# Patient Record
Sex: FEMALE | Race: WHITE | NOT HISPANIC OR LATINO
[De-identification: names, ages, dates, MRNs, and addresses within clinical notes are randomized per-mention and may not be internally consistent; named-entity substitution may affect disease eponyms.]

---

## 2023-03-21 ENCOUNTER — APPOINTMENT (OUTPATIENT)
Dept: VASCULAR SURGERY | Facility: CLINIC | Age: 58
End: 2023-03-21
Payer: COMMERCIAL

## 2023-03-21 PROCEDURE — 99204 OFFICE O/P NEW MOD 45 MIN: CPT

## 2023-04-12 ENCOUNTER — APPOINTMENT (OUTPATIENT)
Dept: VASCULAR SURGERY | Facility: CLINIC | Age: 58
End: 2023-04-12
Payer: COMMERCIAL

## 2023-04-12 PROCEDURE — 99214 OFFICE O/P EST MOD 30 MIN: CPT

## 2023-04-19 PROBLEM — Z00.00 ENCOUNTER FOR PREVENTIVE HEALTH EXAMINATION: Status: ACTIVE | Noted: 2023-04-19

## 2023-05-01 ENCOUNTER — APPOINTMENT (OUTPATIENT)
Dept: VASCULAR SURGERY | Facility: CLINIC | Age: 58
End: 2023-05-01
Payer: COMMERCIAL

## 2023-05-01 ENCOUNTER — NON-APPOINTMENT (OUTPATIENT)
Age: 58
End: 2023-05-01

## 2023-05-01 VITALS
OXYGEN SATURATION: 95 % | WEIGHT: 260 LBS | HEIGHT: 63 IN | RESPIRATION RATE: 18 BRPM | HEART RATE: 66 BPM | BODY MASS INDEX: 46.07 KG/M2 | DIASTOLIC BLOOD PRESSURE: 77 MMHG | SYSTOLIC BLOOD PRESSURE: 173 MMHG

## 2023-05-01 VITALS
RESPIRATION RATE: 18 BRPM | HEART RATE: 68 BPM | DIASTOLIC BLOOD PRESSURE: 89 MMHG | OXYGEN SATURATION: 95 % | SYSTOLIC BLOOD PRESSURE: 143 MMHG

## 2023-05-01 PROCEDURE — 36475 ENDOVENOUS RF 1ST VEIN: CPT | Mod: RT

## 2023-05-02 NOTE — PROCEDURE
[FreeTextEntry1] : RFA right greater saphenous vein. [FreeTextEntry2] : Right greater saphenous venous insufficiency and reflux. [FreeTextEntry3] : Insert the patient was seen in the waiting room where the consent was obtained.  Then the patient was  taken to the procedure room where a timeout was called to verify her identity and the laterality of the procedure. The patient's  leg was  interrogated under ultrasound and an appropriate place for cannulation was identified. The leg  was  prepped  with chloroprep and draped in the standard fashion. Under ultrasound guidance  1% plain lidocaine was injected  in the venous access site. Access was then obtained with a 7 FR sheath in the standard fashion using a micropuncture technique. Inner dilator was removed. The sheath was irrigated. RFA catheter was placed to the SFJ and withdrawn 3.0 cm from the junction.  Tumescence was administered around the saphenous vein under ultrasound guidance. The ablation was  performed Using a total of 8 cycles. The catheter and sheath were withdrawn. Complete hemostasis was achieved with manual compression. Steri strips were applied to catheter insertion site. Leg was wrapped in Kerlix and an ace wrap. Patient tolerated the procedure well and verbalized understanding of post-procedure instructions. The patient was discharged in stable condition.\par \par

## 2023-05-12 ENCOUNTER — APPOINTMENT (OUTPATIENT)
Dept: VASCULAR SURGERY | Facility: CLINIC | Age: 58
End: 2023-05-12

## 2023-05-15 ENCOUNTER — APPOINTMENT (OUTPATIENT)
Dept: VASCULAR SURGERY | Facility: CLINIC | Age: 58
End: 2023-05-15
Payer: COMMERCIAL

## 2023-05-15 VITALS
RESPIRATION RATE: 18 BRPM | SYSTOLIC BLOOD PRESSURE: 142 MMHG | HEART RATE: 59 BPM | OXYGEN SATURATION: 95 % | DIASTOLIC BLOOD PRESSURE: 81 MMHG

## 2023-05-15 VITALS
DIASTOLIC BLOOD PRESSURE: 76 MMHG | HEIGHT: 63 IN | RESPIRATION RATE: 18 BRPM | OXYGEN SATURATION: 98 % | WEIGHT: 260 LBS | BODY MASS INDEX: 46.07 KG/M2 | HEART RATE: 69 BPM | SYSTOLIC BLOOD PRESSURE: 153 MMHG

## 2023-05-15 PROCEDURE — 36475 ENDOVENOUS RF 1ST VEIN: CPT | Mod: LT

## 2023-05-16 NOTE — PROCEDURE
[FreeTextEntry1] : Left greater saphenous vein RF ablation [FreeTextEntry2] : Symptomatic venous insufficiency and reflux.  Edema.  Symptomatic varicose veins [FreeTextEntry3] : The patient was seen in the waiting room where the consent was obtained.  Then the patient was  taken to the procedure room where a timeout was called verifying patient's identity and the laterality of the procedure. The patient's right leg was  interrogated under ultrasound. An appropriate place for cannulation was identified. The leg  was  prepped  with chloroprep and draped in the standard fashion. Under ultrasound guidance  1% plain lidocaine was injected  in the venous access site. Access was then obtained with a 7 FR sheath in the standard fashion using a micropuncture technique. Inner dilator was removed. The sheath was irrigated. RFA catheter was placed to the SFJ and withdrawn 3.0 cm from the junction.  Tumescence was administered around the saphenous vein under ultrasound guidance. The ablation was  performed Using a total of 7 cycles. The catheter and sheath were withdrawn. Complete hemostasis was achieved with manual compression. Steri strips were applied to catheter insertion site. Leg was wrapped in Kerlix and an ace wrap. Patient tolerated the procedure well and verbalized understanding of post-procedure instructions. The patient was discharged in stable condition.\par

## 2023-05-16 NOTE — REASON FOR VISIT
[Procedure: _________] : a [unfilled] procedure visit [FreeTextEntry1] : Left greater saphenous vein ablation

## 2023-05-23 ENCOUNTER — APPOINTMENT (OUTPATIENT)
Dept: VASCULAR SURGERY | Facility: CLINIC | Age: 58
End: 2023-05-23
Payer: COMMERCIAL

## 2023-05-23 PROCEDURE — 99213 OFFICE O/P EST LOW 20 MIN: CPT

## 2023-05-23 PROCEDURE — 93971 EXTREMITY STUDY: CPT

## 2023-05-25 NOTE — ASSESSMENT
[FreeTextEntry1] : 57 year-old female with bilateral lower extremity symptomatic varicose veins and edema.  A previous US demonstrated enlarged saphenous veins with reflux bilaterally. She is now one week s/p an ablation of the left GSV and 3 weeks s/p R GSV ablation and is doing well post-procedure. She underwent a left lower extremity venous duplex that demonstrated an appropriately ablated GSV and no evidence of DVT. Continue compression.\par \par She will follow up in 1-2 months or sooner as needed.

## 2023-05-25 NOTE — HISTORY OF PRESENT ILLNESS
[FreeTextEntry1] : 57 year-old female with a history of smoking quit in 2022, HTN, AF on Xarelto, CAD, fibromyalgia, osteoarthritis presented for a follow up on bilateral lower extremity circulation. Patient has bilateral lower extremity edema with varicose veins. Symptoms have been present for years and have gotten progressively worse over time. Associated with throbbing, discomfort, and pain. Alleviated by leg elevation and rest. Aggravated by prolonged activity and standing. Most pronounced in  the evenings, especially after busy days and prolonged standing.  [de-identified] : 57 year-old female returns in follow up. The patient is now one week s/p an ablation of the left GSV and 3 weeks s/p ablation of R GSV. The patient is doing well post-procedure. swelling imporved. Pain controlled.  She is here to undergo a post-procedure US and discuss the results.\par \par Review of systems: 12 system ROS is negative except for as per HPI.\par \par \par

## 2023-05-25 NOTE — PHYSICAL EXAM
[Normal Thyroid] : the thyroid was normal [2+] : left 2+ [No Rash or Lesion] : No rash or lesion [Alert] : alert [Oriented to Person] : oriented to person [Oriented to Place] : oriented to place [Oriented to Time] : oriented to time [Calm] : calm [JVD] : no jugular venous distention  [Ankle Swelling (On Exam)] : present [Varicose Veins Of Lower Extremities] : not present [] : present [de-identified] : NAD. Awake and Alert. [de-identified] : NCAT. Extraocular muscles in tact. [FreeTextEntry1] : dp/pt biphasic signals. Nonpalpable pedal pulses [de-identified] : Soft, NT, ND. No guarding. No palpable masses. No HSM. [de-identified] : No CVA tenderness. [de-identified] : FROM in all extremities. Normal muscle bulk and tone. [de-identified] : Bilateral edema and varicose veins > 3 mm. Catheter insertion site clean, dry, and in tact. Erythema and tenderness on medial thigh consistent with thrombophlebitis. [de-identified] : AAOx3, CN II-XII intact. Strength 5/5 in all 4 extremities. Sensation intact throughout. [de-identified] : Appropriate affect.

## 2023-07-25 ENCOUNTER — APPOINTMENT (OUTPATIENT)
Dept: VASCULAR SURGERY | Facility: CLINIC | Age: 58
End: 2023-07-25
Payer: COMMERCIAL

## 2023-07-25 VITALS — SYSTOLIC BLOOD PRESSURE: 159 MMHG | DIASTOLIC BLOOD PRESSURE: 110 MMHG | HEART RATE: 57 BPM

## 2023-07-25 DIAGNOSIS — Z82.49 FAMILY HISTORY OF ISCHEMIC HEART DISEASE AND OTHER DISEASES OF THE CIRCULATORY SYSTEM: ICD-10-CM

## 2023-07-25 DIAGNOSIS — Z80.0 FAMILY HISTORY OF MALIGNANT NEOPLASM OF DIGESTIVE ORGANS: ICD-10-CM

## 2023-07-25 DIAGNOSIS — Z87.891 PERSONAL HISTORY OF NICOTINE DEPENDENCE: ICD-10-CM

## 2023-07-25 DIAGNOSIS — Z80.3 FAMILY HISTORY OF MALIGNANT NEOPLASM OF BREAST: ICD-10-CM

## 2023-07-25 DIAGNOSIS — R60.0 LOCALIZED EDEMA: ICD-10-CM

## 2023-07-25 PROCEDURE — 99213 OFFICE O/P EST LOW 20 MIN: CPT

## 2023-07-25 RX ORDER — ROSUVASTATIN CALCIUM 5 MG/1
TABLET, FILM COATED ORAL
Refills: 0 | Status: ACTIVE | COMMUNITY

## 2023-07-25 RX ORDER — FUROSEMIDE 40 MG/1
40 TABLET ORAL
Refills: 0 | Status: ACTIVE | COMMUNITY

## 2023-07-25 RX ORDER — RIVAROXABAN 2.5 MG/1
TABLET, FILM COATED ORAL
Refills: 0 | Status: ACTIVE | COMMUNITY

## 2023-07-25 RX ORDER — ASPIRIN 81 MG
81 TABLET, DELAYED RELEASE (ENTERIC COATED) ORAL
Refills: 0 | Status: ACTIVE | COMMUNITY

## 2023-07-25 NOTE — ASSESSMENT
[FreeTextEntry1] : 58 year-old female with bilateral lower extremity symptomatic varicose veins and edema.  A previous US demonstrated enlarged saphenous veins with reflux bilaterally and left small saphenous vein reflux. She is now s/p an ablation of bilateral greater saphenous veins and is doing well however she does have residual lower extremity edema not controlled with compression.\par \par Patient would like to proceed with endovenous ablation of residual small saphenous vein with reflux.  We discussed risk benefits and alternatives and all questions were answered.  She is planning to see a neurologist.  For restless leg syndrome and orthopedist for bilateral osteoarthritis.\par \par 22 minutes\par \par

## 2023-07-25 NOTE — HISTORY OF PRESENT ILLNESS
[FreeTextEntry1] : 58 year-old female with a history of smoking quit in 2022, HTN, AF on Xarelto, CAD, fibromyalgia, osteoarthritis presented for a follow up on bilateral lower extremity circulation. Patient has bilateral lower extremity edema with varicose veins. Symptoms have been present for years and have gotten progressively worse over time. Associated with throbbing, discomfort, and pain. Alleviated by leg elevation and rest. Aggravated by prolonged activity and standing. Most pronounced in  the evenings, especially after busy days and prolonged standing.  [de-identified] : 58 year-old female returns in follow up. The patient is now s/p an ablation of the BGSV. The patient is doing well post-procedure and her recovery was uncomplicated.. swelling improved but did not resolve completely.  She continues to have residual swelling and discomfort in bilateral lower extremities that is not well controlled with compression.\par \par Patient continues to complain of bilateral lower extremity osteoarthritis of the knees and left hip.  She has restless leg syndrome for which she is planning to see a neurologist.\par \par Review of systems: 12 system ROS is negative except for as per HPI.\par \par \par

## 2023-07-25 NOTE — DATA REVIEWED
[FreeTextEntry1] : Left lower extremity venous duplex ordered, performed, and reviewed - No DVT, appropriately ablated GSV.

## 2023-07-25 NOTE — PHYSICAL EXAM
[Normal Thyroid] : the thyroid was normal [2+] : left 2+ [Ankle Swelling (On Exam)] : present [] : present [No Rash or Lesion] : No rash or lesion [Alert] : alert [Oriented to Person] : oriented to person [Oriented to Place] : oriented to place [Oriented to Time] : oriented to time [Calm] : calm [JVD] : no jugular venous distention  [Varicose Veins Of Lower Extremities] : not present [de-identified] : NAD. Awake and Alert. [de-identified] : NCAT. Extraocular muscles in tact. [FreeTextEntry1] : dp/pt biphasic signals. Nonpalpable pedal pulses [de-identified] : Soft, NT, ND. No guarding. No palpable masses. No HSM. [de-identified] : No CVA tenderness. [de-identified] : FROM in all extremities. Normal muscle bulk and tone. [de-identified] : Bilateral edema and varicose veins > 3 mm. Catheter insertion site clean, dry, and in tact. Erythema and tenderness on medial thigh consistent with thrombophlebitis. [de-identified] : AAOx3, CN II-XII intact. Strength 5/5 in all 4 extremities. Sensation intact throughout. [de-identified] : Appropriate affect.

## 2024-03-28 ENCOUNTER — APPOINTMENT (OUTPATIENT)
Dept: VASCULAR SURGERY | Facility: CLINIC | Age: 59
End: 2024-03-28
Payer: COMMERCIAL

## 2024-03-28 DIAGNOSIS — E78.5 HYPERLIPIDEMIA, UNSPECIFIED: ICD-10-CM

## 2024-03-28 DIAGNOSIS — I48.91 UNSPECIFIED ATRIAL FIBRILLATION: ICD-10-CM

## 2024-03-28 DIAGNOSIS — E11.9 TYPE 2 DIABETES MELLITUS W/OUT COMPLICATIONS: ICD-10-CM

## 2024-03-28 DIAGNOSIS — I83.899 VARICOSE VEINS OF UNSPECIFIED LOWER EXTREMITY WITH OTHER COMPLICATIONS: ICD-10-CM

## 2024-03-28 DIAGNOSIS — I10 ESSENTIAL (PRIMARY) HYPERTENSION: ICD-10-CM

## 2024-03-28 PROCEDURE — 99214 OFFICE O/P EST MOD 30 MIN: CPT

## 2024-03-28 RX ORDER — DULOXETINE HYDROCHLORIDE 40 MG/1
CAPSULE, DELAYED RELEASE PELLETS ORAL
Refills: 0 | Status: ACTIVE | COMMUNITY

## 2024-03-28 RX ORDER — METOPROLOL TARTRATE 75 MG/1
75 TABLET, FILM COATED ORAL
Refills: 0 | Status: ACTIVE | COMMUNITY

## 2024-03-28 NOTE — ASSESSMENT
[FreeTextEntry1] : 58-year-old female, previously a patient of Dr. Echevarria, who underwent bilateral GSV ablations for symptomatic venous reflux.  She reports good symptomatic relief after procedures.  She is scheduled to undergo right SSV ablation, but was lost to follow-up.  She reports that she has varicose veins along her left medial thigh and calf which continued to cause her pain and discomfort.  She reports she has heaviness and aching of both legs as the day progresses. She is softly palpable right-sided pulses, triphasic left-sided signals, we will obtain an KEATON to ensure no arterial disease.  Will obtain venous duplex to assess for axial reflux as contributory to recurrent venous issues, after which she will follow-up with me.  Advised to continue use of compression garments.

## 2024-03-28 NOTE — HISTORY OF PRESENT ILLNESS
[FreeTextEntry1] : 58 year-old female with a history of smoking quit in 2022, HTN, AF on Xarelto, CAD, fibromyalgia, osteoarthritis presented for a follow up on bilateral lower extremity circulation. Patient has bilateral lower extremity edema with varicose veins. Symptoms have been present for years and have gotten progressively worse over time. Associated with throbbing, discomfort, and pain. Alleviated by leg elevation and rest. Aggravated by prolonged activity and standing. Most pronounced in  the evenings, especially after busy days and prolonged standing.  [de-identified] : 58 year-old female returns in follow up. The patient is now s/p an ablation of the BGSV. The patient is doing well post-procedure and her recovery was uncomplicated.. swelling improved but did not resolve completely.  She continues to have residual swelling and discomfort in bilateral lower extremities that is not well controlled with compression.  Patient continues to complain of bilateral lower extremity osteoarthritis of the knees and left hip.  She has restless leg syndrome for which she is planning to see a neurologist.  Review of systems: 12 system ROS is negative except for as per HPI.  3/28/24 - 58-year-old female, previously a patient of Dr. Echevarria, who underwent bilateral GSV ablations for symptomatic venous reflux.  She reports good symptomatic relief after procedures.  She is scheduled to undergo right SSV ablation, but was lost to follow-up.  She reports that she has varicose veins along her left medial thigh and calf which continued to cause her pain and discomfort.  She reports she has heaviness and aching of both legs as the day progresses.

## 2024-03-28 NOTE — REVIEW OF SYSTEMS
[Lower Ext Edema] : lower extremity edema [As Noted in HPI] : as noted in HPI [Negative] : Gastrointestinal

## 2024-03-28 NOTE — PHYSICAL EXAM
[JVD] : no jugular venous distention  [Normal Thyroid] : the thyroid was normal [2+] : left 2+ [1+] : right 1+ [0] : left 0 [Ankle Swelling (On Exam)] : present [Varicose Veins Of Lower Extremities] : not present [] : present [No Rash or Lesion] : No rash or lesion [Alert] : alert [Oriented to Person] : oriented to person [Oriented to Place] : oriented to place [Oriented to Time] : oriented to time [Calm] : calm [de-identified] : NAD [de-identified] : NCAT [FreeTextEntry1] : Strong triphasic pedal signals, softly palpable pulses [de-identified] : Soft, NT, ND. No guarding. No palpable masses. No HSM. [de-identified] : No CVA tenderness. [de-identified] : Left medial thigh and calf with varicose veins, palpable cord along GSV consistent with prior ablation [de-identified] : Appropriate affect.

## 2024-05-30 ENCOUNTER — APPOINTMENT (OUTPATIENT)
Dept: VASCULAR SURGERY | Facility: CLINIC | Age: 59
End: 2024-05-30

## 2024-06-27 ENCOUNTER — APPOINTMENT (OUTPATIENT)
Dept: VASCULAR SURGERY | Facility: CLINIC | Age: 59
End: 2024-06-27

## 2024-06-27 VITALS
BODY MASS INDEX: 38.98 KG/M2 | SYSTOLIC BLOOD PRESSURE: 143 MMHG | HEART RATE: 59 BPM | WEIGHT: 220 LBS | HEIGHT: 63 IN | DIASTOLIC BLOOD PRESSURE: 82 MMHG

## 2024-06-27 DIAGNOSIS — I87.2 VENOUS INSUFFICIENCY (CHRONIC) (PERIPHERAL): ICD-10-CM

## 2024-06-27 PROCEDURE — 99214 OFFICE O/P EST MOD 30 MIN: CPT

## 2025-03-16 NOTE — DATA REVIEWED
[FreeTextEntry1] : Left lower extremity venous duplex ordered, performed, and reviewed - No DVT, appropriately ablated GSV. No